# Patient Record
Sex: FEMALE | Race: ASIAN | NOT HISPANIC OR LATINO
[De-identification: names, ages, dates, MRNs, and addresses within clinical notes are randomized per-mention and may not be internally consistent; named-entity substitution may affect disease eponyms.]

---

## 2023-03-10 ENCOUNTER — APPOINTMENT (OUTPATIENT)
Dept: UROLOGY | Facility: CLINIC | Age: 53
End: 2023-03-10

## 2024-04-12 ENCOUNTER — APPOINTMENT (OUTPATIENT)
Dept: UROLOGY | Facility: CLINIC | Age: 54
End: 2024-04-12
Payer: COMMERCIAL

## 2024-04-12 VITALS
WEIGHT: 116.4 LBS | RESPIRATION RATE: 18 BRPM | HEART RATE: 88 BPM | OXYGEN SATURATION: 96 % | TEMPERATURE: 97.5 F | SYSTOLIC BLOOD PRESSURE: 131 MMHG | BODY MASS INDEX: 22.26 KG/M2 | HEIGHT: 60.63 IN | DIASTOLIC BLOOD PRESSURE: 87 MMHG

## 2024-04-12 DIAGNOSIS — R31.29 OTHER MICROSCOPIC HEMATURIA: ICD-10-CM

## 2024-04-12 PROCEDURE — 99203 OFFICE O/P NEW LOW 30 MIN: CPT

## 2024-04-12 PROCEDURE — G2211 COMPLEX E/M VISIT ADD ON: CPT

## 2024-04-12 RX ORDER — PRAVASTATIN SODIUM 20 MG/1
20 TABLET ORAL
Refills: 0 | Status: ACTIVE | COMMUNITY

## 2024-04-12 NOTE — ADDENDUM
[FreeTextEntry1] : Entered by Edwin Kim, acting as scribe for Dr. Raj Garcia. The documentation recorded by the scribe accurately reflects the service I personally performed and the decisions made by me.

## 2024-04-12 NOTE — LETTER BODY
[FreeTextEntry1] : Mathew Ortiz M.D. [phone] [place] [address] [phone]  Dear Dr. Ortiz,   Reason for visit: Abnormal urinalysis. Possible microscopic hematuria     This is an 54 year-old woman who was found to have abnormal urinalysis. Her test report demonstrated evidence of hematuria on urinary dipstick. However, on microscopy there were no red cells seen.  Patient had negative hematuria evaluation 8 years ago.  The patient denies any aggravating or relieving factors. The patient denies any interference of function. The patient is entirely asymptomatic. All other review of systems are negative. She has no cancer in her family medical history. She has no previous surgical history. Past medical history, family history and social history were inquired and were noncontributory to current condition. The patient does not use tobacco or drink alcohol. Medications and allergies were reviewed. She has no known allergies to medication.  On examination, the patient is a healthy-appearing woman in no acute distress. She is alert and oriented follows commands. She has normal mood and affect. She is normocephalic. Neck is supple. Respirations are unlabored. Abdomen is soft and nontender. Liver is not palpable. Bladder is nonpalpable. No CVA tenderness. Neurologically she is grossly intact. No peripheral edema. Skin without gross abnormality.  Urinalysis demonstrated no red cells per high-power field microscopy.  There is trace hematuria on dipstick.  Assessment: Abnormal urinalysis, possible microscopic hematuria.  I counseled the patient on the various etiologies of the microscopic hematuria. I discussed the risk of occult malignancy. Given the absence of red blood cells on microscopy, the urine dipstick may represent a false positive result. I recommended the patient repeat the urinalysis and obtain a urine cytology prior to proceeding with hematuria evaluation. I answered the patient's questions. The risks and expected outcomes were discussed. The patient will follow up as directed and contact me with any questions or concerns.  Plan: Repeat urinalysis, and obtain urine cytology. Follow-up in six months.  I spent 30-minutes time today on all issues related to this patient on today date of service including non face to face time.

## 2024-04-13 LAB
APPEARANCE: CLEAR
BACTERIA: NEGATIVE /HPF
BILIRUBIN URINE: NEGATIVE
BLOOD URINE: NEGATIVE
CAST: 0 /LPF
COLOR: YELLOW
EPITHELIAL CELLS: 0 /HPF
GLUCOSE QUALITATIVE U: NEGATIVE MG/DL
KETONES URINE: NEGATIVE MG/DL
LEUKOCYTE ESTERASE URINE: NEGATIVE
MICROSCOPIC-UA: NORMAL
NITRITE URINE: NEGATIVE
PH URINE: 6
PROTEIN URINE: NEGATIVE MG/DL
RED BLOOD CELLS URINE: 0 /HPF
SPECIFIC GRAVITY URINE: 1.01
UROBILINOGEN URINE: 0.2 MG/DL
WHITE BLOOD CELLS URINE: 0 /HPF

## 2024-04-16 LAB — URINE CYTOLOGY: NORMAL

## 2024-10-11 ENCOUNTER — APPOINTMENT (OUTPATIENT)
Dept: UROLOGY | Facility: CLINIC | Age: 54
End: 2024-10-11

## 2024-10-18 ENCOUNTER — APPOINTMENT (OUTPATIENT)
Dept: UROLOGY | Facility: CLINIC | Age: 54
End: 2024-10-18